# Patient Record
Sex: MALE | Race: WHITE | NOT HISPANIC OR LATINO | Employment: FULL TIME | URBAN - METROPOLITAN AREA
[De-identification: names, ages, dates, MRNs, and addresses within clinical notes are randomized per-mention and may not be internally consistent; named-entity substitution may affect disease eponyms.]

---

## 2024-04-26 ENCOUNTER — HOSPITAL ENCOUNTER (EMERGENCY)
Facility: CLINIC | Age: 54
Discharge: HOME | End: 2024-04-26
Attending: EMERGENCY MEDICINE | Admitting: EMERGENCY MEDICINE
Payer: COMMERCIAL

## 2024-04-26 VITALS
TEMPERATURE: 97.3 F | HEART RATE: 90 BPM | DIASTOLIC BLOOD PRESSURE: 93 MMHG | OXYGEN SATURATION: 95 % | SYSTOLIC BLOOD PRESSURE: 172 MMHG | RESPIRATION RATE: 18 BRPM

## 2024-04-26 DIAGNOSIS — Z78.9 ALCOHOL USE: ICD-10-CM

## 2024-04-26 DIAGNOSIS — F41.9 ANXIETY: ICD-10-CM

## 2024-04-26 PROBLEM — F43.22 ADJUSTMENT DISORDER WITH ANXIETY: Status: ACTIVE | Noted: 2024-04-26

## 2024-04-26 PROBLEM — F10.20 ALCOHOL DEPENDENCE (H): Status: ACTIVE | Noted: 2024-04-26

## 2024-04-26 PROCEDURE — 250N000013 HC RX MED GY IP 250 OP 250 PS 637: Performed by: EMERGENCY MEDICINE

## 2024-04-26 PROCEDURE — 99283 EMERGENCY DEPT VISIT LOW MDM: CPT

## 2024-04-26 RX ORDER — HYDROXYZINE HYDROCHLORIDE 25 MG/1
25-50 TABLET, FILM COATED ORAL 3 TIMES DAILY PRN
Qty: 15 TABLET | Refills: 0 | Status: SHIPPED | OUTPATIENT
Start: 2024-04-26

## 2024-04-26 RX ORDER — LORAZEPAM 1 MG/1
1 TABLET ORAL ONCE
Status: COMPLETED | OUTPATIENT
Start: 2024-04-26 | End: 2024-04-26

## 2024-04-26 RX ADMIN — LORAZEPAM 1 MG: 1 TABLET ORAL at 07:37

## 2024-04-26 ASSESSMENT — COLUMBIA-SUICIDE SEVERITY RATING SCALE - C-SSRS
6. HAVE YOU EVER DONE ANYTHING, STARTED TO DO ANYTHING, OR PREPARED TO DO ANYTHING TO END YOUR LIFE?: NO
1. IN THE PAST MONTH, HAVE YOU WISHED YOU WERE DEAD OR WISHED YOU COULD GO TO SLEEP AND NOT WAKE UP?: NO
2. HAVE YOU ACTUALLY HAD ANY THOUGHTS OF KILLING YOURSELF IN THE PAST MONTH?: NO

## 2024-04-26 ASSESSMENT — ACTIVITIES OF DAILY LIVING (ADL)
ADLS_ACUITY_SCORE: 35

## 2024-04-26 NOTE — CONSULTS
Diagnostic Evaluation Consultation  Crisis Assessment    Patient Name: Jeevan Guzman  Age:  53 year old  Legal Sex: male  Gender Identity: male  Pronouns:   Race: White  Ethnicity: Not  or   Language: English      Patient was assessed: Virtual: SEDLine Crisis Assessment Start Time: 0737 Crisis Assessment Stop Time: 0813  Patient location: [facility] EMERGENCY DEPT                                 Referral Data and Chief Complaint  Jeevan Guzman presents to the ED with family/friends. Patient is presenting to the ED for the following concerns: Anxiety, Intoxication.   Factors that make the mental health crisis life threatening or complex are:  Pt presents to ED due to increased drinking in response to worsening anxiety.  Pt reports for about a week he's been drinking daily, throughout the day, and not going into work.  He states he's drinking to cope with his worsening anxiety.  Pt reports he's worrying about his parents because they're in [place] and can't help them as much he also indicates some stress about work.  Pt denies SI, SIB or hallucinations.  Pt presents as nervous and cooperative.  Pt's spouse was present and he consented to her being in the room during interview.  Pt at times became tearful when his spouse was providing her input.  Pt reports he is hoping to get medication to help with his anxiety..      Informed Consent and Assessment Methods  Explained the crisis assessment process, including applicable information disclosures and limits to confidentiality, assessed understanding of the process, and obtained consent to proceed with the assessment.  Assessment methods included conducting a formal interview with patient, review of medical records, collaboration with medical staff, and obtaining relevant collateral information from family and community providers when available.  : done     Patient response to interventions: acceptance expressed, verbalizes understanding  Coping  "skills were attempted to reduce the crisis:  Pt denies use of coping skills.  He's been drinking alcohol to cope.     History of the Crisis   Pt indicates a history of binge use which tends to correlate with times he is experiencing high anxiety.  He reports he'll drink for 2-3 days as a way to cope and typically he won't drink unless there is a stressor.  Pt's spouse confirms this is his pattern. Pt reports he used to take Trazodone and Alprazolam, a few years ago.  Pt denies any previous therapy or psychiatric hospitalization.  He denies having previous mental health diagnoses.  Based on what patient reports, it appears his anxiety is situational and has difficulty controlling worried thoughts when he has increased stressors in his life.    Brief Psychosocial History  Family:  , Children yes (daughter age 15, two foster kids age 15 and 16)  Support System:  Wife  Employment Status:  employed full-time  Source of Income:  salary/wages  Financial Environmental Concerns:  none  Current Hobbies:  travel, other (see comments), interaction with pets (3D printing)  Barriers in Personal Life:  mental health concerns    Significant Clinical History  Current Anxiety Symptoms:  excessive worry, shortness of breath or racing heart, anxious (restlessness)  Current Depression/Trauma:  avoidance, withdrawl/isolation, difficulty concentrating (trouble staying asleep)  Current Somatic Symptoms:  excessive worry, shortness of breath or racing heart, anxious  Current Psychosis/Thought Disturbance:   (pt denies)  Current Eating Symptoms:  loss of appetite  Chemical Use History:  Alcohol: Binge (drinking a beer every hour basically \"while I'm awake\")  Last Use:: 04/26/24  Benzodiazepines: None  Opiates: None  Cocaine: None  Marijuana: None  Other Use: None  Withdrawal Symptoms: Tremors  Addictions:  (pt denies)   Past diagnosis:  No known past diagnosis  Family history:  No known history of mental health or chemical health " "concerns  Past treatment:  Primary Care  Details of most recent treatment:  Pt denies any recent treatment.  Other relevant history:  Pt reports having colon cancer at age 38.  He denies any ongoing health concerns.       Collateral Information  Is there collateral information: Yes     Collateral information name, relationship, phone number:  Ching, spouse, [phone]    What happened today: Pt's spouse reports when anything with the family is going on \"he can't handle it\" and has been experiencing high anxiety and worry lately.     What is different about patient's functioning: Ching states, most of the time pt is fine but then a little things he starts to worry and make bigger.  Mary reports the drinking increases.  She states otherwise he hardly drinks.       Has patient made comments about wanting to kill themselves/others: no    If d/c is recommended, can they take part in safety/aftercare planning:  yes         Risk Assessment  [place] Suicide Severity Rating Scale Full Clinical Version:  Suicidal Ideation  Q1 Wish to be Dead (Lifetime): No  Q2 Non-Specific Active Suicidal Thoughts (Lifetime): No  Q6 Suicide Behavior (Lifetime): no     Suicidal Behavior (Lifetime)  Actual Attempt (Lifetime): No  Has subject engaged in non-suicidal self-injurious behavior? (Lifetime): No  Interrupted Attempts (Lifetime): No  Aborted or Self-Interrupted Attempt (Lifetime): No  Preparatory Acts or Behavior (Lifetime): No    [place] Suicide Severity Rating Scale Recent:   Suicidal Ideation (Recent)  Q1 Wished to be Dead (Past Month): no  Q2 Suicidal Thoughts (Past Month): no  Level of Risk per Screen: no risks indicated     Suicidal Behavior (Recent)  Actual Attempt (Past 3 Months): No  Has subject engaged in non-suicidal self-injurious behavior? (Past 3 Months): No  Interrupted Attempts (Past 3 Months): No  Aborted or Self-Interrupted Attempt (Past 3 Months): No  Preparatory Acts or Behavior (Past 3 Months): No    Environmental " or Psychosocial Events: helplessness/hopelessness, ongoing abuse of substances, other life stressors  Protective Factors: Protective Factors: strong bond to family unit, community support, or employment, intact marriage or domestic partnership, responsibilities and duties to others, including pets and children, able to access care without barriers, sense of importance of health and wellness, lives in a responsibly safe and stable environment, help seeking, constructive use of leisure time, enjoyable activities, resilience    Does the patient have thoughts of harming others? Feels Like Hurting Others: no  Previous Attempt to Hurt Others: no  Current presentation:  (pt presents as calm and cooperative)  Is the patient engaging in sexually inappropriate behavior?: no    Is the patient engaging in sexually inappropriate behavior?  no        Mental Status Exam   Affect: Flat  Appearance: Appropriate  Attention Span/Concentration: Attentive  Eye Contact: Engaged    Fund of Knowledge: Appropriate   Language /Speech Content: Fluent  Language /Speech Volume: Normal  Language /Speech Rate/Productions: Articulate  Recent Memory: Intact  Remote Memory: Intact  Mood: Anxious  Orientation to Person: Yes   Orientation to Place: Yes  Orientation to Time of Day: Yes  Orientation to Date: Yes     Situation (Do they understand why they are here?): Yes  Psychomotor Behavior: Normal  Thought Content: Clear  Thought Form: Intact          Medication  Psychotropic medications:   Medication Orders - Psychiatric (From admission, onward)      Start     Dose/Rate Route Frequency Ordered Stop    04/26/24 0000  hydrOXYzine HCl (ATARAX) 25 MG tablet         25-50 mg Oral 3 TIMES DAILY PRN 04/26/24 0815               Current Care Team  Patient Care Team:  No Ref-Primary, Physician as PCP - General    Diagnosis  Patient Active Problem List   Diagnosis    Adjustment disorder with anxiety    Alcohol dependence (H)       Primary Problem This  Admission  Active Hospital Problems    *Adjustment disorder with anxiety, F43.22      Alcohol dependence (H), F10.20        Clinical Summary and Substantiation of Recommendations   Pt presents to the ED due to worsening anxiety which is contributing to increased alcohol use.  Pt states he's been drinking daily this past week, a beer every hour, most of the time that he's awake.  He reports he hasn't gone into work this week due to this.  Pt indicates stressor of his parents getting older, being in [place], and not being able to help them.  He reports a history of having periods where he's experiencing increased stress and worry/anxiety and then starts drinking as a way to cope.  He identifies a pattern of drinking for 2-3 days at a time during the high anxiety times and then typically not drinking on a regular basis.  Pt denies a history of mental health diagnoses, no previous therapy and no hospitalizations for mental health.  He reports a few years ago when he was experiencing an episode of high anxiety he was taking Alprazolam and Trazodone.  Pt denies SI, SIB, HI or past suicide attempts.  After therapeutic assessment, intervention and aftercare planning by ED care team and LM and in consultation with attending provider, the patient's circumstances and mental state were appropriate for outpatient management. It is the recommendation of this clinician that pt discharge with OP MH support. Pt was scheduled for medication management appointment on 4/29/24 and was recommended to engage in therapy but declined.         Patient coping skills attempted to reduce the crisis:  Pt denies use of coping skills.  He's been drinking alcohol to cope.    Disposition  Recommended disposition: Medication Management, Individual Therapy        Reviewed case and recommendations with attending provider. Attending Name: Dr. Venegas       Attending concurs with disposition: yes       Patient and/or validated legal guardian concurs  with disposition:   yes (Pt agrees to medication management but does not want therapy)       Final disposition:  discharge    Legal status on admission: Voluntary/Patient has signed consent for treatment    Assessment Details   Total duration spent with the patient: 36 min     CPT code(s) utilized: 98531 - Psychotherapy for Crisis - 60 (30-74*) min    Jocelyn Kehr Sparby, LPCC, PAULAC, Psychotherapist  [facility] - Triage & Transition Services  Callback: [phone]

## 2024-04-26 NOTE — LETTER
April 26, 2024      To Whom It May Concern:      Jeevan Guzman was seen in our Emergency Department today, 04/26/24.   Sincerely,        Ciaran Venegas MD

## 2024-04-26 NOTE — ED PROVIDER NOTES
History     Chief Complaint:  Alcohol Problem       HPI   Jeevan Guzman is a 53 year old male with no reported past medical history reports increasing anxiousness and daily drinking to accommodate this for the last 2 weeks.  Reports of stress with his parents as the cause.  Denies homicidal suicidal nation, denies any drug overdosing.  He does not follow with psychiatry.      Independent Historian:   None - Patient Only    Review of External Notes:   None       Medications:    hydrOXYzine HCl (ATARAX) 25 MG tablet        Past Medical History:    No past medical history on file.    Past Surgical History:    No past surgical history on file.     Physical Exam   Patient Vitals for the past 24 hrs:   BP Temp Temp src Pulse Resp SpO2   24 0830 (!) 172/93 97.3  F (36.3  C) Oral 90 18 95 %   24 0632 (!) 176/122 97.9  F (36.6  C) Temporal 92 20 98 %        Physical Exam  Constitutional: Alert, attentive, GCS 15   Eyes: EOM are normal, anicteric, conjugate gaze  CV: distal extremities warm, well perfused  Chest: Non-labored breathing on RA  Neurological: Alert, attentive, moving all extremities equally.   Skin: Skin is warm and dry.  Psych: no SI or HI      Emergency Department Course   Emergency Department Course & Assessments:  Interventions:  Medications   LORazepam (ATIVAN) tablet 1 mg (1 mg Oral $Given 24 0737)        Independent Interpretation (X-rays, CTs, rhythm strip):  None    Consultations/Discussion of Management or Tests:  None        Social Determinants of Health affecting care:   None    Disposition:  The patient was discharged.     Impression & Plan    Medical Decision Makin-year-old male without reported past medical history presenting for evaluation of increased anxiousness for which she is using alcohol to cope over the last week or so.  Breathalyzer here 0.152 though he is clinically sober, walking and talking with a steady gait.  He was given Ativan for anxiolysis.  He was seen  by [facility] who recommended discharge with plan for outpatient med management. Patient declined therapy referral.      Diagnosis:    ICD-10-CM    1. Alcohol use  Z78.9       2. Anxiety  F41.9            Discharge Medications:  New Prescriptions    HYDROXYZINE HCL (ATARAX) 25 MG TABLET    Take 1-2 tablets (25-50 mg) by mouth 3 times daily as needed for anxiety        Ciaran Venegas MD  [employer]  8:56 AM 04/26/24           Ciaran Venegas MD  04/26/24 0857

## 2024-04-26 NOTE — DISCHARGE INSTRUCTIONS
Mental Health Appointment    Type: Telepsychiatry  Date: Sunday, 4/28/2024  Time: 11:00 am - 12:00 pm  Provider: Jodee Alfaro  MSN  CNP,PMHNP,RN  Location: [address], [place] [address]  Phone: [phone]  Patient Instructions:  Please call to confirm appointment.

## 2024-04-26 NOTE — LETTER
[facility] EMERGENCY DEPT  [address] [place] [address]  [phone]    Jeevan Guzman  [id number] [address] [facility] [address]  [phone] (home)     : 1970      To Whom it may concern:    Jeevan Guzman was seen in our Emergency Department today, 2024 for an evaluation. Please excuse him from work for today.      Sincerely,      ________________  Ciaran Venegas MD

## 2024-04-26 NOTE — ED TRIAGE NOTES
Pt to ER with c/o drinking due to depression, pt denies suicidal thoughts, pt states increased anxiety